# Patient Record
Sex: FEMALE | Race: WHITE | NOT HISPANIC OR LATINO | URBAN - METROPOLITAN AREA
[De-identification: names, ages, dates, MRNs, and addresses within clinical notes are randomized per-mention and may not be internally consistent; named-entity substitution may affect disease eponyms.]

---

## 2018-05-10 ENCOUNTER — OFFICE VISIT (OUTPATIENT)
Dept: URGENT CARE | Facility: CLINIC | Age: 28
End: 2018-05-10
Payer: OTHER MISCELLANEOUS

## 2018-05-10 VITALS
SYSTOLIC BLOOD PRESSURE: 124 MMHG | WEIGHT: 180 LBS | HEART RATE: 94 BPM | BODY MASS INDEX: 33.13 KG/M2 | TEMPERATURE: 98.7 F | OXYGEN SATURATION: 96 % | HEIGHT: 62 IN | DIASTOLIC BLOOD PRESSURE: 80 MMHG

## 2018-05-10 DIAGNOSIS — S71.152A DOG BITE OF LEFT THIGH, INITIAL ENCOUNTER: Primary | ICD-10-CM
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
## 2018-05-10 PROCEDURE — 99204 OFFICE O/P NEW MOD 45 MIN: CPT | Mod: 25 | Performed by: PHYSICIAN ASSISTANT

## 2018-05-10 PROCEDURE — 90715 TDAP VACCINE 7 YRS/> IM: CPT | Performed by: PHYSICIAN ASSISTANT

## 2018-05-10 PROCEDURE — 90471 IMMUNIZATION ADMIN: CPT | Performed by: PHYSICIAN ASSISTANT

## 2018-05-10 RX ORDER — AMOXICILLIN AND CLAVULANATE POTASSIUM 875; 125 MG/1; MG/1
1 TABLET, FILM COATED ORAL 2 TIMES DAILY
Qty: 14 TAB | Refills: 0 | Status: SHIPPED | OUTPATIENT
Start: 2018-05-10 | End: 2018-05-17

## 2018-05-10 NOTE — PATIENT INSTRUCTIONS
Animal Bite  Introduction  Animal bite wounds can get infected. It is important to get proper medical treatment. Ask your doctor if you need rabies treatment.  Follow these instructions at home:  Wound care  · Follow instructions from your doctor about how to take care of your wound. Make sure you:  ¨ Wash your hands with soap and water before you change your bandage (dressing). If you cannot use soap and water, use hand .  ¨ Change your bandage as told by your doctor.  ¨ Leave stitches (sutures), skin glue, or skin tape (adhesive) strips in place. They may need to stay in place for 2 weeks or longer. If tape strips get loose and curl up, you may trim the loose edges. Do not remove tape strips completely unless your doctor says it is okay.  · Check your wound every day for signs of infection. Watch for:  ¨ Redness, swelling, or pain that gets worse.  ¨ Fluid, blood, or pus.  General instructions  · Take or apply over-the-counter and prescription medicines only as told by your doctor.  · If you were prescribed an antibiotic, take or apply it as told by your doctor. Do not stop using the antibiotic even if your condition improves.  · Keep the injured area raised (elevated) above the level of your heart while you are sitting or lying down.  · If directed, apply ice to the injured area.  ¨ Put ice in a plastic bag.  ¨ Place a towel between your skin and the bag.  ¨ Leave the ice on for 20 minutes, 2-3 times per day.  · Keep all follow-up visits as told by your doctor. This is important.  Contact a doctor if:  · You have redness, swelling, or pain that gets worse.  · You have a general feeling of sickness (malaise).  · You feel sick to your stomach (nauseous).  · You throw up (vomit).  · You have pain that does not get better.  Get help right away if:  · You have a red streak going away from your wound.  · You have fluid, blood, or pus coming from your wound.  · You have a fever or chills.  · You have trouble  moving your injured area.  · You have numbness or tingling anywhere on your body.  This information is not intended to replace advice given to you by your health care provider. Make sure you discuss any questions you have with your health care provider.  Document Released: 12/18/2006 Document Revised: 05/25/2017 Document Reviewed: 05/04/2016  © 2017 [employer]

## 2018-05-12 NOTE — PROGRESS NOTES
Subjective:   Pt is a 27 y.o. female who presents with Animal Bite (left leg )            HPI  Pt notes her friend's dog came up behind her this morning and bit her on the left thigh causing immediate injury and bleeding. Pt has not taken any Rx medications for this condition. Pt notes tetanus is out of date. Pt states the pain is a 5-6/10, aching in nature and worse at the time of injury about 1 hr ago. Pt denies CP, SOB, NVD, paresthesias, headaches, dizziness, change in vision, hives, or other joint pain. The pt's medication list, problem list, and allergies have been evaluated and reviewed during today's visit.    PMH:  Negative per pt.      PSH:  Negative per pt.      Fam Hx:  the patient's family history is not pertinent to their current complaint      Soc HX:  Social History     Social History   • Marital status: Single     Spouse name: N/A   • Number of children: N/A   • Years of education: N/A     Occupational History   • Not on file.     Social History Main Topics   • Smoking status: Never Smoker   • Smokeless tobacco: Never Used   • Alcohol use Yes   • Drug use: No   • Sexual activity: Not on file     Other Topics Concern   • Not on file     Social History Narrative   • No narrative on file         Medications:    Current Outpatient Prescriptions:   •  amoxicillin-clavulanate (AUGMENTIN) 875-125 MG Tab, Take 1 Tab by mouth 2 times a day for 7 days., Disp: 14 Tab, Rfl: 0      Allergies:  Patient has no known allergies.    ROS  Constitutional: Negative for fever, chills and malaise/fatigue.   HENT: Negative for congestion and sore throat.    Eyes: Negative for blurred vision, double vision and photophobia.   Respiratory: Negative for cough and shortness of breath.    Cardiovascular: Negative for chest pain and palpitations.   Gastrointestinal: Negative for heartburn, nausea, vomiting, abdominal pain, diarrhea and constipation.   Genitourinary: Negative for dysuria and flank pain.   Musculoskeletal: POS for  "dog bite of left thigh region  Skin: Negative for itching and rash.   Neurological: Negative for dizziness, tingling and headaches.   Endo/Heme/Allergies: Does not bruise/bleed easily.   Psychiatric/Behavioral: Negative for depression. The patient is not nervous/anxious.           Objective:     /80   Pulse 94   Temp 37.1 °C (98.7 °F)   Ht 1.575 m (5' 2\")   Wt 81.6 kg (180 lb)   SpO2 96%   BMI 32.92 kg/m²      Physical Exam   Musculoskeletal:        Left upper leg: She exhibits tenderness, swelling and laceration. She exhibits no bony tenderness, no edema and no deformity.        Legs:        Constitutional: PT is oriented to person, place, and time. PT appears well-developed and well-nourished. No distress.   HENT:   Head: Normocephalic and atraumatic.   Mouth/Throat: Oropharynx is clear and moist. No oropharyngeal exudate.   Eyes: Conjunctivae normal and EOM are normal. Pupils are equal, round, and reactive to light.   Neck: Normal range of motion. Neck supple. No thyromegaly present.   Cardiovascular: Normal rate, regular rhythm, normal heart sounds and intact distal pulses.  Exam reveals no gallop and no friction rub.    No murmur heard.  Pulmonary/Chest: Effort normal and breath sounds normal. No respiratory distress. PT has no wheezes. PT has no rales. Pt exhibits no tenderness.   Abdominal: Soft. Bowel sounds are normal. PT exhibits no distension and no mass. There is no tenderness. There is no rebound and no guarding.   Neurological: PT is alert and oriented to person, place, and time. PT has normal reflexes. No cranial nerve deficit.   Skin: Skin is warm and dry. No rash noted. PT is not diaphoretic. SEE [facility]      Psychiatric: PT has a normal mood and affect. PT behavior is normal. Judgment and thought content normal.          Assessment/Plan:     1. Dog bite of left thigh, initial encounter    - TDAP VACCINE =>6YO IM  - amoxicillin-clavulanate (AUGMENTIN) 875-125 MG Tab; Take 1 Tab by mouth 2 " times a day for 7 days.  Dispense: 14 Tab; Refill: 0    Tetanus is updated in clinic today  RICE therapy discussed  Gentle ROM exercises discussed  WBAT left LE  Ice/heat therapy discussed  NSAIDs for pain control  Rest, fluids encouraged.  AVS with medical info given.  Pt was in full understanding and agreement with the plan.  Follow-up as needed if symptoms worsen or fail to improve.